# Patient Record
(demographics unavailable — no encounter records)

---

## 2020-06-10 NOTE — NUR
[id number] from streets, asking for ativan and water. pt is agitated while being 
triage. states "im dehydrated". to ER bed 9, hooked to monitor, awaiting MD abernathy.

## 2020-06-10 NOTE — NUR
PATIENT REQUESTS FOR PAIN MEDICATION FOR R RIB, MD OFFERED PATIENT TORADOL 30MG 
IV, PATIENT REFUSED. PATIENT REQUESTES PERCOCET. MD REFUSES TO GIVE PATIENT 
PERCOCET.

## 2020-06-10 NOTE — NUR
PATIENT IN BED ASLEEP, EASILY AROUSABLE BY VOICE. HOOKED TO MONITOR, WILL 
CONTINUE TO MONITOR ACCORDINGLY.